# Patient Record
Sex: MALE | Race: WHITE | NOT HISPANIC OR LATINO | Employment: FULL TIME | URBAN - METROPOLITAN AREA
[De-identification: names, ages, dates, MRNs, and addresses within clinical notes are randomized per-mention and may not be internally consistent; named-entity substitution may affect disease eponyms.]

---

## 2018-10-22 ENCOUNTER — OFFICE VISIT (OUTPATIENT)
Dept: FAMILY MEDICINE | Facility: CLINIC | Age: 53
End: 2018-10-22
Payer: COMMERCIAL

## 2018-10-22 VITALS
TEMPERATURE: 98 F | HEART RATE: 71 BPM | DIASTOLIC BLOOD PRESSURE: 60 MMHG | RESPIRATION RATE: 18 BRPM | OXYGEN SATURATION: 96 % | BODY MASS INDEX: 37.24 KG/M2 | HEIGHT: 75 IN | SYSTOLIC BLOOD PRESSURE: 118 MMHG | WEIGHT: 299.5 LBS

## 2018-10-22 DIAGNOSIS — B02.9 HERPES ZOSTER WITHOUT COMPLICATION: ICD-10-CM

## 2018-10-22 DIAGNOSIS — E03.8 OTHER SPECIFIED HYPOTHYROIDISM: ICD-10-CM

## 2018-10-22 DIAGNOSIS — I10 ESSENTIAL HYPERTENSION: ICD-10-CM

## 2018-10-22 PROBLEM — F41.1 GENERALIZED ANXIETY DISORDER: Status: ACTIVE | Noted: 2018-10-22

## 2018-10-22 PROCEDURE — 99999 PR PBB SHADOW E&M-NEW PATIENT-LVL IV: CPT | Mod: PBBFAC,,, | Performed by: FAMILY MEDICINE

## 2018-10-22 PROCEDURE — 99213 OFFICE O/P EST LOW 20 MIN: CPT | Mod: S$GLB,,, | Performed by: FAMILY MEDICINE

## 2018-10-22 PROCEDURE — 3008F BODY MASS INDEX DOCD: CPT | Mod: CPTII,S$GLB,, | Performed by: FAMILY MEDICINE

## 2018-10-22 RX ORDER — GABAPENTIN 100 MG/1
100 CAPSULE ORAL 3 TIMES DAILY
Qty: 30 CAPSULE | Refills: 0 | Status: SHIPPED | OUTPATIENT
Start: 2018-10-22 | End: 2018-11-06

## 2018-10-22 RX ORDER — TRAMADOL HYDROCHLORIDE 50 MG/1
1 TABLET ORAL
Refills: 0 | COMMUNITY
Start: 2018-10-19

## 2018-10-22 RX ORDER — METHYLPREDNISOLONE 4 MG/1
TABLET ORAL
Qty: 1 PACKAGE | Refills: 0 | Status: SHIPPED | OUTPATIENT
Start: 2018-10-22 | End: 2018-11-12

## 2018-10-22 NOTE — ASSESSMENT & PLAN NOTE
- complete Acyclovir  - supportive care  - Medrol dose pack and Gabapentin   - pt instructed to notify me if no improvement or any worsening

## 2018-10-22 NOTE — ASSESSMENT & PLAN NOTE
- reports just had labs done by PCP and dose needs to be increased  - Reports that his PCP in [place] is managing

## 2018-10-22 NOTE — LETTER
October 22, 2018      [address] [place]   [place] [address]  Phone: [phone]  Fax: [phone]       Patient: Abel Watson   YOB: 1965  Date of Visit: 10/22/2018    To Whom It May Concern:    Michelle Watson  was at [facility] on 10/22/2018. He may return to work on 10/29/18 with no restrictions.     If you have any questions or concerns, or if I can be of further assistance, please do not hesitate to contact me.    Sincerely,    Jesenia Aggarwal, DO

## 2018-10-22 NOTE — PROGRESS NOTES
FAMILY MEDICINE    Patient Active Problem List   Diagnosis    Other specified hypothyroidism    Hypertension    Herpes zoster without complication    Generalized anxiety disorder       CC:   Chief Complaint   Patient presents with    Herpes Zoster     generalized x 1 week with pain    Establish Care    Medication Refill    Flu Vaccine     pt request flu vaccine       HPI: Abel Watson is a 53 y.o. male  - with HTN and hypothyroidism here to establish care and f/u for recent diagnosis of shingles. Reports that shingles rash started 10/15/18 and pt was seen at [facility] ER 10/16/18 and started on Acyclovir 800 mg five times a day for 10 days. Per ER not, rash was noted on T3 dermatome and was tingling and burning and only a few lesions.   Pt reports that it seems to have worsened from his back and now traveled to the front. +puritic and burning +vesciles. No purulent drainage. No fever, chills, SOB, cough or headaches.   Reports that here temporarily for work. Originally from [place] but just moved down from [place]. His PCP is in [place].          ROS: Review of Systems   Constitutional: Negative for appetite change, chills, fatigue and fever.   HENT: Negative for mouth sores.    Eyes: Negative for photophobia, pain, redness, itching and visual disturbance.   Respiratory: Negative for chest tightness, shortness of breath and wheezing.    Cardiovascular: Negative for chest pain, palpitations and leg swelling.   Gastrointestinal: Negative for abdominal pain, diarrhea, nausea and vomiting.   Musculoskeletal: Negative for arthralgias and myalgias.   Skin: Positive for rash.   Neurological: Negative for dizziness, light-headedness and headaches.   Psychiatric/Behavioral: Positive for sleep disturbance.       ALLERGIES:   Review of patient's allergies indicates:   Allergen Reactions    Sulfa (sulfonamide antibiotics) Rash       MEDS:     Current Outpatient Medications:     acyclovir (ZOVIRAX) 800 MG Tab, Take 1  tablet (800 mg total) by mouth 5 (five) times daily. for 10 days, Disp: 40 tablet, Rfl: 0    ibuprofen (ADVIL,MOTRIN) 600 MG tablet, Take 1 tablet (600 mg total) by mouth every 6 (six) hours as needed for Pain., Disp: 20 tablet, Rfl: 0    levothyroxine (SYNTHROID) 100 MCG tablet, Take 100 mcg by mouth once daily., Disp: , Rfl:     lisinopril-hydrochlorothiazide (PRINZIDE,ZESTORETIC) 10-12.5 mg per tablet, Take 1 tablet by mouth once daily., Disp: , Rfl:     sertraline (ZOLOFT) 25 MG tablet, Take 25 mg by mouth once daily., Disp: , Rfl:     traMADol (ULTRAM) 50 mg tablet, Take 1 tablet by mouth 5 (five) times daily., Disp: , Rfl: 0    gabapentin (NEURONTIN) 100 MG capsule, Take 1 capsule (100 mg total) by mouth 3 (three) times daily. for 15 days, Disp: 30 capsule, Rfl: 0    methylPREDNISolone (MEDROL DOSEPACK) 4 mg tablet, use as directed, Disp: 1 Package, Rfl: 0    Past Medical History:   Diagnosis Date    Anxiousness     6/2018    Hypertension     Hypothyroidism     2013    JAQUI (obstructive sleep apnea)     CPAP       Past Surgical History:   Procedure Laterality Date    ADENOIDECTOMY      Bone Spur Left Foot      NASAL TURBINATE REDUCTION      PATELLA FRACTURE SURGERY      Right Hip Replacement Right     11/2017    TONSILLECTOMY         Family History   Problem Relation Age of Onset    Diabetes Mother     Cancer Brother         non-Hogkin's lymphoma    Heart disease Brother         cardiomyopathy    No Known Problems Daughter     No Known Problems Son        Social History     Socioeconomic History    Marital status:      Spouse name: Not on file    Number of children: 2    Years of education: Not on file    Highest education level: Not on file   Social Needs    Financial resource strain: Not on file    Food insecurity - worry: Not on file    Food insecurity - inability: Not on file    Transportation needs - medical: Not on file    Transportation needs - non-medical: Not on  "file   Occupational History     Employer: [employer]     Comment: insulation   Tobacco Use    Smoking status: Former Smoker    Smokeless tobacco: Never Used    Tobacco comment: 5 years ago    Substance and Sexual Activity    Alcohol use: No     Frequency: Never    Drug use: No    Sexual activity: Not on file   Other Topics Concern    Not on file   Social History Narrative    From [place]. Moved to [place] from [place]. Lives at [facility].        OBJECTIVE:   Vitals:    10/22/18 1427   BP: 118/60   BP Location: Left arm   Patient Position: Sitting   BP Method: Large (Manual)   Pulse: 71   Resp: 18   Temp: 98 °F (36.7 °C)   TempSrc: Oral   SpO2: 96%   Weight: 135.9 kg (299 lb 8 oz)   Height: 6' 3" (1.905 m)     Body mass index is 37.43 kg/m².    Physical Exam   Constitutional: No distress.   Neck: Normal range of motion. Neck supple.   Cardiovascular: Normal rate, regular rhythm, normal heart sounds and intact distal pulses.   Pulmonary/Chest: Effort normal and breath sounds normal.   Musculoskeletal: He exhibits no edema.   Neurological: He is alert.   Skin: Skin is warm. Capillary refill takes less than 2 seconds. Rash noted.                     PERTINENT RESULTS:   No recent labs in Epic    ASSESSMENT:  Problem List Items Addressed This Visit        Cardiac/Vascular    Hypertension    Current Assessment & Plan     - well controlled  - continuing same meds            ID    Herpes zoster without complication    Current Assessment & Plan     - complete Acyclovir  - supportive care  - Medrol dose pack and Gabapentin   - pt instructed to notify me if no improvement or any worsening         Relevant Medications    methylPREDNISolone (MEDROL DOSEPACK) 4 mg tablet    gabapentin (NEURONTIN) 100 MG capsule       Endocrine    Other specified hypothyroidism    Current Assessment & Plan     - reports just had labs done by PCP and dose needs to be increased  - Reports that his PCP in [place] is managing          "       PLAN:   Orders Placed This Encounter    methylPREDNISolone (MEDROL DOSEPACK) 4 mg tablet    gabapentin (NEURONTIN) 100 MG capsule     Follow-up as needed and if stays in the area (pt reports moving completing job and moving back in [place] 3/2019), rec f/u in 6 months.     Dr. Jesenia Aggarwal D.O.   [facility]

## 2019-01-18 DIAGNOSIS — Z12.11 COLON CANCER SCREENING: ICD-10-CM

## 2020-05-08 DIAGNOSIS — Z12.11 COLON CANCER SCREENING: ICD-10-CM

## 2020-05-11 ENCOUNTER — PATIENT OUTREACH (OUTPATIENT)
Dept: ADMINISTRATIVE | Facility: HOSPITAL | Age: 55
End: 2020-05-11